# Patient Record
Sex: FEMALE | Race: WHITE
[De-identification: names, ages, dates, MRNs, and addresses within clinical notes are randomized per-mention and may not be internally consistent; named-entity substitution may affect disease eponyms.]

---

## 2017-08-29 ENCOUNTER — HOSPITAL ENCOUNTER (OUTPATIENT)
Dept: HOSPITAL 44 - LAB | Age: 77
End: 2017-08-29
Attending: PHYSICIAN ASSISTANT
Payer: MEDICARE

## 2017-08-29 DIAGNOSIS — E11.9: Primary | ICD-10-CM

## 2017-08-29 DIAGNOSIS — E83.52: ICD-10-CM

## 2017-08-29 PROCEDURE — 83036 HEMOGLOBIN GLYCOSYLATED A1C: CPT

## 2017-08-29 PROCEDURE — 82330 ASSAY OF CALCIUM: CPT

## 2017-08-29 PROCEDURE — 36415 COLL VENOUS BLD VENIPUNCTURE: CPT

## 2017-11-07 ENCOUNTER — HOSPITAL ENCOUNTER (OUTPATIENT)
Dept: HOSPITAL 44 - CARD | Age: 77
End: 2017-11-07
Attending: INTERNAL MEDICINE
Payer: MEDICARE

## 2017-11-07 DIAGNOSIS — Z95.2: ICD-10-CM

## 2017-11-07 DIAGNOSIS — Z86.73: ICD-10-CM

## 2017-11-07 DIAGNOSIS — I10: ICD-10-CM

## 2017-11-07 DIAGNOSIS — E78.5: ICD-10-CM

## 2017-11-07 DIAGNOSIS — I25.10: Primary | ICD-10-CM

## 2017-11-07 DIAGNOSIS — E11.9: ICD-10-CM

## 2018-03-21 ENCOUNTER — HOSPITAL ENCOUNTER (OUTPATIENT)
Dept: HOSPITAL 44 - LAB | Age: 78
End: 2018-03-21
Attending: PHYSICIAN ASSISTANT
Payer: MEDICARE

## 2018-03-21 DIAGNOSIS — E55.9: ICD-10-CM

## 2018-03-21 DIAGNOSIS — R53.83: ICD-10-CM

## 2018-03-21 DIAGNOSIS — E11.9: Primary | ICD-10-CM

## 2018-03-21 LAB
BASOPHILS NFR BLD: 0.5 % (ref 0–1.5)
EGFR (AFRICAN): > 60
EGFR (NON-AFRICAN): > 60
EOSINOPHIL NFR BLD: 2.2 % (ref 0–6.8)
MCH RBC QN AUTO: 29.4 PG (ref 28–34)
MCV RBC AUTO: 88.1 FL (ref 80–100)
MONOCYTES %: 3.6 % (ref 0–11)
NEUTROPHILS #: 4.3 # K/UL (ref 1.4–7.7)
VIT B12 SERPL-MCNC: 289 PG/ML (ref 211–946)

## 2018-03-21 PROCEDURE — 84443 ASSAY THYROID STIM HORMONE: CPT

## 2018-03-21 PROCEDURE — 85025 COMPLETE CBC W/AUTO DIFF WBC: CPT

## 2018-03-21 PROCEDURE — 82306 VITAMIN D 25 HYDROXY: CPT

## 2018-03-21 PROCEDURE — 80053 COMPREHEN METABOLIC PANEL: CPT

## 2018-03-21 PROCEDURE — 36415 COLL VENOUS BLD VENIPUNCTURE: CPT

## 2018-03-21 PROCEDURE — 82607 VITAMIN B-12: CPT

## 2018-03-21 PROCEDURE — 83036 HEMOGLOBIN GLYCOSYLATED A1C: CPT

## 2018-03-29 ENCOUNTER — HOSPITAL ENCOUNTER (OUTPATIENT)
Dept: HOSPITAL 44 - LAB | Age: 78
End: 2018-03-29
Attending: PHYSICIAN ASSISTANT
Payer: MEDICARE

## 2018-03-29 DIAGNOSIS — E83.52: Primary | ICD-10-CM

## 2018-03-29 PROCEDURE — 83970 ASSAY OF PARATHORMONE: CPT

## 2018-05-01 ENCOUNTER — HOSPITAL ENCOUNTER (OUTPATIENT)
Dept: HOSPITAL 44 - CARD | Age: 78
End: 2018-05-01
Attending: INTERNAL MEDICINE
Payer: MEDICARE

## 2018-05-01 DIAGNOSIS — Z95.2: Primary | ICD-10-CM

## 2018-05-01 DIAGNOSIS — R53.83: ICD-10-CM

## 2018-05-01 DIAGNOSIS — E11.9: ICD-10-CM

## 2018-05-01 DIAGNOSIS — I10: ICD-10-CM

## 2018-05-01 DIAGNOSIS — Z86.73: ICD-10-CM

## 2018-05-01 DIAGNOSIS — E78.5: ICD-10-CM

## 2018-05-01 DIAGNOSIS — I25.10: ICD-10-CM

## 2018-05-01 LAB
BASOPHILS NFR BLD: 0.3 % (ref 0–1.5)
EGFR (AFRICAN): > 60
EGFR (NON-AFRICAN): > 60
EOSINOPHIL NFR BLD: 2.1 % (ref 0–6.8)
MCH RBC QN AUTO: 30.2 PG (ref 28–34)
MCV RBC AUTO: 88.3 FL (ref 80–100)
MONOCYTES %: 3 % (ref 0–11)
NEUTROPHILS #: 4 # K/UL (ref 1.4–7.7)

## 2018-05-01 PROCEDURE — 80053 COMPREHEN METABOLIC PANEL: CPT

## 2018-05-01 PROCEDURE — 84443 ASSAY THYROID STIM HORMONE: CPT

## 2018-05-01 PROCEDURE — 36415 COLL VENOUS BLD VENIPUNCTURE: CPT

## 2018-05-01 PROCEDURE — 85025 COMPLETE CBC W/AUTO DIFF WBC: CPT

## 2018-05-07 ENCOUNTER — HOSPITAL ENCOUNTER (OUTPATIENT)
Dept: HOSPITAL 44 - RAD | Age: 78
End: 2018-05-07
Attending: PHYSICIAN ASSISTANT
Payer: MEDICARE
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
## 2018-05-07 DIAGNOSIS — M54.5: ICD-10-CM

## 2018-05-07 DIAGNOSIS — M79.644: ICD-10-CM
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
## 2018-05-07 DIAGNOSIS — M25.539: ICD-10-CM

## 2018-05-07 DIAGNOSIS — M79.602: Primary | ICD-10-CM

## 2018-05-07 PROCEDURE — 73130 X-RAY EXAM OF HAND: CPT

## 2018-05-07 PROCEDURE — 73090 X-RAY EXAM OF FOREARM: CPT

## 2018-05-07 PROCEDURE — 72100 X-RAY EXAM L-S SPINE 2/3 VWS: CPT

## 2018-05-07 NOTE — DIAGNOSTIC IMAGING REPORT
WILMER TRIPP 

[facility]

[address] [facility].[address] [address]

 

 

 

 

Report Submission Date: May 7, 2018 2:46:01 PM CDT

Patient       Study

Name:   SHELLY VARGAS       Date:   May 7, 2018 2:00:13 PM CDT

MRN:   [id number]       Modality Type:   DX

Gender:   F       Description:   UPPER EXTREMITY

:   44       Institution:   [facility]

Physician:   WILMER TRIPP

     Accession:    [id number]

 

 

Examination: Plain film left forearm 



History: PAIN X 2 DAYS AFTER FALL (Hx) 



Comparison exams: None available 



Findings: 2 views of the left radius and ulna demonstrates osteopenia. 
Questionable lucencies/irregularities involving the radial wrist articulation. 
No evidence for other cortical abnormality. No soft tissue abnormality. 



Impression: Questionable cortical irregularity involving the wrist radial 
articular margin. Consider obtaining targeted CT to further evaluate.

 

Electronically signed on May 7, 2018 2:46:01 PM CDT by:

Nael BLANCA

## 2018-05-07 NOTE — DIAGNOSTIC IMAGING REPORT
WILMER TRIPP 

[facility]

[address] [facility] [address] [address]

 

 

 

 

Report Submission Date: May 7, 2018 2:47:52 PM CDT

Patient       Study

Name:   SHELLY VARGAS       Date:   May 7, 2018 2:22:46 PM CDT

MRN:   [id number]       Modality Type:   DX

Gender:   F       Description:   UPPER EXTREMITY

:   44       Institution:   [facility]

Physician:   WILMER TRIPP

     Accession:    [id number]

 

 

Examination: Plain film right hand 



History: PAIN X 2 DAYS AFTER FALL (Hx) 



Comparison exams: None available 



Findings: 3 views the hand demonstrates osteopenia. Articular degenerative 
changes. No fracture.  No dislocation. No soft tissue abnormality. 



Impression: Osteopenia and degenerative changes. No acute osseous abnormality

 

Electronically signed on May 7, 2018 2:47:52 PM CDT by:

Nael BLANCA

## 2018-05-07 NOTE — DIAGNOSTIC IMAGING REPORT
WILMER TRIPP 

[facility]

[address] [facility].[address] [address]

 

 

 

 

Report Submission Date: May 7, 2018 2:57:10 PM CDT

Patient       Study

Name:   SHELLY VARGAS       Date:   May 7, 2018 2:10:40 PM CDT

MRN:   [id number]       Modality Type:   DX

Gender:   F       Description:   SPINE

:   44       Institution:   [facility]

Physician:   WILMER TRIPP

     Accession:    [id number]

 

 

Examination: Plain film lumbar spine 



History: PAIN X 2 DAYS AFTER FALL (Hx) 



Findings: 3 views of the lumbar spine demonstrate normal height.  No anterior 
compression. 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 Angulation L4
/L5. Facet degenerative changes. Anterior osteophytes. Atherosclerotic disease 
involving the abdominal aorta. 



Impression: Degenerative changes and prior surgical fixation. No compression 
deformity. L4/L5 angulation - correlate with any older exams to determine 
chronicity.

 

Electronically signed on May 7, 2018 2:57:10 PM CDT by:

Nael BLANCA

## 2018-05-30 ENCOUNTER — HOSPITAL ENCOUNTER (OUTPATIENT)
Dept: HOSPITAL 44 - RAD | Age: 78
End: 2018-05-30
Attending: PHYSICIAN ASSISTANT
Payer: MEDICARE

## 2018-05-30 DIAGNOSIS — M25.531: Primary | ICD-10-CM

## 2018-05-30 PROCEDURE — 73200 CT UPPER EXTREMITY W/O DYE: CPT

## 2018-05-30 NOTE — DIAGNOSTIC IMAGING REPORT
WILMER TRIPP 

[facility]

[address] [facility] [address] [place] [address]. [address]

 

 

 

 

Report Submission Date: May 30, 2018 11:48:28 AM CDT

Patient       Study

Name:   SHELLY VARGAS       Date:   May 30, 2018 9:28:15 AM CDT

MRN:   [id number]       Modality Type:   CT\SR

Gender:   F       Description:   CT ARM W/O CONTRAST

:   44       Institution:   [facility]

Physician:   WILMER TRIPP

     Accession:    [id number]

 

 

Examination: CT right extremity. 



History: RT WRIST PAIN AFTER FALL (Hx) / ITS.REASON RT WRIST PAIN AFTER FALL (
DICOM Hx) 



Comparison exams: Plain film dated 7 May 2018 



Technique: Axial imaging was sagittal and coronal reconstruction. 



Findings: Cortical margins demonstrate articular degenerative changes. 
Scattered ossific spurring. No cortical lucency or displacement. No evidence 
for widening of the scapholunate space. 



Impression: Articular degenerative changes. No evidence for cortical lucency/
fracture. If discomfort persist, consider further evaluation with MRI.

 

Electronically signed on May 30, 2018 11:48:28 AM CDT by:

Nael BLANCA

## 2018-07-17 ENCOUNTER — HOSPITAL ENCOUNTER (OUTPATIENT)
Dept: HOSPITAL 44 - CARD | Age: 78
End: 2018-07-17
Attending: INTERNAL MEDICINE
Payer: MEDICARE

## 2018-07-17 DIAGNOSIS — E83.52: ICD-10-CM

## 2018-07-17 DIAGNOSIS — Z86.79: ICD-10-CM

## 2018-07-17 DIAGNOSIS — I25.10: ICD-10-CM

## 2018-07-17 DIAGNOSIS — E78.5: ICD-10-CM

## 2018-07-17 DIAGNOSIS — I10: ICD-10-CM

## 2018-07-17 DIAGNOSIS — Z95.2: Primary | ICD-10-CM

## 2018-07-17 DIAGNOSIS — E11.9: ICD-10-CM

## 2018-07-17 LAB
EGFR (AFRICAN): > 60
EGFR (NON-AFRICAN): > 60

## 2018-07-17 PROCEDURE — 80061 LIPID PANEL: CPT

## 2018-07-17 PROCEDURE — 36415 COLL VENOUS BLD VENIPUNCTURE: CPT

## 2018-07-17 PROCEDURE — 80048 BASIC METABOLIC PNL TOTAL CA: CPT

## 2018-08-13 ENCOUNTER — HOSPITAL ENCOUNTER (OUTPATIENT)
Dept: HOSPITAL 44 - LAB | Age: 78
End: 2018-08-13
Attending: INTERNAL MEDICINE
Payer: MEDICARE

## 2018-08-13 DIAGNOSIS — Z95.2: Primary | ICD-10-CM

## 2018-08-13 DIAGNOSIS — I25.10: ICD-10-CM

## 2018-08-13 PROCEDURE — 80048 BASIC METABOLIC PNL TOTAL CA: CPT

## 2018-08-13 PROCEDURE — 36415 COLL VENOUS BLD VENIPUNCTURE: CPT

## 2018-08-14 LAB
EGFR (AFRICAN): > 60
EGFR (NON-AFRICAN): > 60